# Patient Record
Sex: FEMALE | Race: ASIAN | NOT HISPANIC OR LATINO | Employment: OTHER | URBAN - METROPOLITAN AREA
[De-identification: names, ages, dates, MRNs, and addresses within clinical notes are randomized per-mention and may not be internally consistent; named-entity substitution may affect disease eponyms.]

---

## 2024-03-11 ENCOUNTER — OFFICE VISIT (OUTPATIENT)
Dept: PRIMARY CARE CLINIC | Facility: CLINIC | Age: 34
End: 2024-03-11
Payer: COMMERCIAL

## 2024-03-11 VITALS
OXYGEN SATURATION: 99 % | DIASTOLIC BLOOD PRESSURE: 80 MMHG | WEIGHT: 115.63 LBS | BODY MASS INDEX: 22.7 KG/M2 | HEART RATE: 73 BPM | SYSTOLIC BLOOD PRESSURE: 122 MMHG | HEIGHT: 60 IN

## 2024-03-11 DIAGNOSIS — K59.00 CONSTIPATION, UNSPECIFIED CONSTIPATION TYPE: Primary | ICD-10-CM

## 2024-03-11 DIAGNOSIS — K64.9 HEMORRHOIDS, UNSPECIFIED HEMORRHOID TYPE: ICD-10-CM

## 2024-03-11 PROCEDURE — 99203 OFFICE O/P NEW LOW 30 MIN: CPT | Mod: S$GLB,,, | Performed by: FAMILY MEDICINE

## 2024-03-11 PROCEDURE — 1159F MED LIST DOCD IN RCRD: CPT | Mod: CPTII,S$GLB,, | Performed by: FAMILY MEDICINE

## 2024-03-11 PROCEDURE — 99999 PR PBB SHADOW E&M-NEW PATIENT-LVL III: CPT | Mod: PBBFAC,,, | Performed by: FAMILY MEDICINE

## 2024-03-11 PROCEDURE — 3079F DIAST BP 80-89 MM HG: CPT | Mod: CPTII,S$GLB,, | Performed by: FAMILY MEDICINE

## 2024-03-11 PROCEDURE — 3008F BODY MASS INDEX DOCD: CPT | Mod: CPTII,S$GLB,, | Performed by: FAMILY MEDICINE

## 2024-03-11 PROCEDURE — 3074F SYST BP LT 130 MM HG: CPT | Mod: CPTII,S$GLB,, | Performed by: FAMILY MEDICINE

## 2024-03-11 NOTE — PROGRESS NOTES
Clinic Note  3/11/2024      Subjective:       Patient ID:  Marleny is a 33 y.o. female being seen for an established visit.    Chief Complaint: Constipation    Constipation-patient has been dealing with constipation for years and has tried multiple different over-the-counter laxatives and stool softeners including MiraLax, Metamucil, docusate, probiotics.  Patient has a bowel movement every 2-3 days, reports having very hard stools.  Patient has tried doing digital exam and felt a mass on the inside, no masses on the outside.  History of 3 pregnancies.  Denies any blood in stool, diarrhea, weight loss.      Review of Systems   Constitutional:  Negative for chills, fever, malaise/fatigue and weight loss.   HENT:  Negative for congestion, sinus pain and sore throat.    Respiratory:  Negative for cough, shortness of breath and wheezing.    Cardiovascular:  Negative for chest pain and palpitations.   Gastrointestinal:  Positive for constipation. Negative for abdominal pain, blood in stool, diarrhea, heartburn, melena, nausea and vomiting.   Genitourinary:  Negative for dysuria, frequency and urgency.   Musculoskeletal:  Negative for myalgias.   Skin:  Negative for rash.   Neurological:  Negative for headaches.           There is no problem list on file for this patient.          Objective:      /80 (BP Location: Left arm, Patient Position: Sitting, BP Method: Medium (Manual))   Pulse 73   Ht 5' (1.524 m)   Wt 52.4 kg (115 lb 10.1 oz)   LMP 02/06/2024 (Approximate)   SpO2 99%   BMI 22.58 kg/m²   Estimated body mass index is 22.58 kg/m² as calculated from the following:    Height as of this encounter: 5' (1.524 m).    Weight as of this encounter: 52.4 kg (115 lb 10.1 oz).  Physical Exam  Vitals reviewed.   Constitutional:       General: She is not in acute distress.     Appearance: She is not diaphoretic.   HENT:      Head: Normocephalic and atraumatic.   Eyes:      Conjunctiva/sclera: Conjunctivae normal.    Cardiovascular:      Rate and Rhythm: Normal rate and regular rhythm.      Heart sounds: Normal heart sounds.   Pulmonary:      Effort: Pulmonary effort is normal. No respiratory distress.      Breath sounds: Normal breath sounds. No wheezing.   Abdominal:      General: Bowel sounds are normal.      Palpations: Abdomen is soft.   Musculoskeletal:         General: Normal range of motion.      Cervical back: Normal range of motion.   Skin:     General: Skin is warm and dry.      Findings: No erythema or rash.   Neurological:      Mental Status: She is alert and oriented to person, place, and time.   Psychiatric:         Mood and Affect: Mood and affect normal.         Behavior: Behavior normal.         Thought Content: Thought content normal.         Judgment: Judgment normal.           Assessment and Plan:     1. Constipation, unspecified constipation type  - patient with constipation for years and has failed multiple over-the-counter laxatives and stool softeners, will do a trial of Linzess.  Discussed with patient that we can titrate the dose depending on her response.  Patient with symptoms consistent with internal hemorrhoids, though no anoscope urine clinic.  Refer to Colorectal surgery  - linaCLOtide (LINZESS) 145 mcg Cap capsule; Take 1 capsule (145 mcg total) by mouth before breakfast. Bon / di cau  Dispense: 15 capsule; Refill: 6  - Ambulatory referral/consult to Colorectal Surgery; Future    2. Hemorrhoids, unspecified hemorrhoid type  - Ambulatory referral/consult to Colorectal Surgery; Future      Follow Up:   No follow-ups on file.    Other Orders Placed This Visit:  No orders of the defined types were placed in this encounter.        Adonis Love MD        This note is dictated on M*Modal word recognition program.  There are word recognition mistakes that are occasionally missed on review.

## 2024-03-12 ENCOUNTER — PATIENT OUTREACH (OUTPATIENT)
Dept: ADMINISTRATIVE | Facility: OTHER | Age: 34
End: 2024-03-12
Payer: COMMERCIAL

## 2024-03-12 NOTE — PROGRESS NOTES
CHW - Initial Contact    This Community Health Worker completed the Social Determinant of Health questionnaire with MRN [id number] over the phone via  today.    Pt identified barriers of most importance are: No barriers reported   Referrals to community agencies completed with patient/caregiver consent outside of [facility] include: No   Referrals were put through [facility] - No   Support and Services: No   Other information discussed the patient needs / wants help with: SDOH completed. Patient declined needing assistance at this time. Case will be will be closed.   Follow up required: No   No future outreach task assigned

## 2024-03-20 ENCOUNTER — OFFICE VISIT (OUTPATIENT)
Dept: SURGERY | Facility: CLINIC | Age: 34
End: 2024-03-20
Payer: COMMERCIAL

## 2024-03-20 VITALS
DIASTOLIC BLOOD PRESSURE: 78 MMHG | HEART RATE: 73 BPM | BODY MASS INDEX: 22.59 KG/M2 | WEIGHT: 115.06 LBS | SYSTOLIC BLOOD PRESSURE: 120 MMHG | HEIGHT: 60 IN

## 2024-03-20 DIAGNOSIS — K64.9 HEMORRHOIDS, UNSPECIFIED HEMORRHOID TYPE: ICD-10-CM

## 2024-03-20 DIAGNOSIS — K59.00 CONSTIPATION, UNSPECIFIED CONSTIPATION TYPE: ICD-10-CM

## 2024-03-20 PROCEDURE — 1160F RVW MEDS BY RX/DR IN RCRD: CPT | Mod: CPTII,S$GLB,, | Performed by: NURSE PRACTITIONER

## 2024-03-20 PROCEDURE — 1159F MED LIST DOCD IN RCRD: CPT | Mod: CPTII,S$GLB,, | Performed by: NURSE PRACTITIONER

## 2024-03-20 PROCEDURE — 3074F SYST BP LT 130 MM HG: CPT | Mod: CPTII,S$GLB,, | Performed by: NURSE PRACTITIONER

## 2024-03-20 PROCEDURE — 3008F BODY MASS INDEX DOCD: CPT | Mod: CPTII,S$GLB,, | Performed by: NURSE PRACTITIONER

## 2024-03-20 PROCEDURE — 99999 PR PBB SHADOW E&M-EST. PATIENT-LVL III: CPT | Mod: PBBFAC,,, | Performed by: NURSE PRACTITIONER

## 2024-03-20 PROCEDURE — 99204 OFFICE O/P NEW MOD 45 MIN: CPT | Mod: 25,S$GLB,, | Performed by: NURSE PRACTITIONER

## 2024-03-20 PROCEDURE — 46600 DIAGNOSTIC ANOSCOPY SPX: CPT | Mod: S$GLB,,, | Performed by: NURSE PRACTITIONER

## 2024-03-20 PROCEDURE — 3078F DIAST BP <80 MM HG: CPT | Mod: CPTII,S$GLB,, | Performed by: NURSE PRACTITIONER

## 2024-03-20 RX ORDER — HYDROCORTISONE 25 MG/G
CREAM TOPICAL 2 TIMES DAILY
Qty: 28 G | Refills: 1 | Status: SHIPPED | OUTPATIENT
Start: 2024-03-20 | End: 2024-05-07 | Stop reason: SDUPTHER

## 2024-03-20 NOTE — PROGRESS NOTES
CRS Office Visit History and Physical    Referring Md:   Adonis Love Md  [address] [place] [address]    SUBJECTIVE:     Chief Complaint: constipation and anorectal lesion, here with her     History of Present Illness:  The patient is new patient to this practice. HamiltonCharbel galloway #[id number]  Course is as follows:  Patient is a 33 y.o. female presents with constipation and anorectal lesion.  Constipation more than 10 years, she digitally tries to remove her stool and recently noticed a Bump in her rectum. Bump present more than a month ago.  +discomfort  +straining and bleeding sometimes.   Was just prescribed linzess 145 mcg by her PCP , it was too expensive so she never started as it was over $200  She has tried multiple OTC meds for the constipation, and they work if she takes them but if she stop constipation returns.      Last Colonoscopy: never  Family history of colorectal cancer or IBD: no.    Review of patient's allergies indicates:  No Known Allergies    History reviewed. No pertinent past medical history.  History reviewed. No pertinent surgical history.  History reviewed. No pertinent family history.  Social History     Tobacco Use    Smoking status: Never    Smokeless tobacco: Never        Review of Systems:  Review of Systems   Constitutional:  Negative for chills, fever and weight loss.   Gastrointestinal:  Negative for nausea and vomiting.       OBJECTIVE:     Vital Signs (Most Recent)  /78 (BP Location: Left arm, Patient Position: Sitting)   Pulse 73   Ht 5' (1.524 m)   Wt 52.2 kg (115 lb 1.3 oz)   LMP 02/06/2024 (Approximate)   BMI 22.48 kg/m²     Physical Exam:  General:  female in no distress   Neuro: Alert and oriented to person, place, and time.  Moves all extremities.     HEENT: No icterus.  Trachea midline  Respiratory: Respirations are even and unlabored, no cough or audible wheezing  Skin: Warm dry and intact, No visible rashes, no jaundice    Labs  reviewed today:  Lab Results   Component Value Date    INR 0.9 09/19/2023         Anorectal Exam:  Cony soriano    Anal Skin: External hemorrhoids    Digital Rectal Exam:  Resting Tone normal  Mass hemorrhoids  Tenderness  absent    Anoscopy:  Verbal consent was obtained.   Clear plastic anoscope inserted.    Hemorrhoids  present  Stigmata of bleeding  present  Stigmata of prolapsed  absent  Distal rectal mucosa normal          ASSESSMENT/PLAN:     Diagnoses and all orders for this visit:    Constipation, unspecified constipation type  -     Ambulatory referral/consult to Colorectal Surgery  -     linaCLOtide (LINZESS) 145 mcg Cap capsule; Take 1 capsule (145 mcg total) by mouth before breakfast.    Hemorrhoids, unspecified hemorrhoid type  -     Ambulatory referral/consult to Colorectal Surgery  -     hydrocortisone (ANUSOL-HC) 2.5 % rectal cream; Place rectally 2 (two) times daily.      33 y.o. F here for chronic constipation. This was discussed in detail today. This is a greater than 10 yr hx, so likely idiopathic. Does respond to OTC colace if taken regularly, which is fine for her to take. We can try sending the linzess to an [facility] and maybe they can get her insurance to cover this   For the anal lumps she is feeling, these are likely her hemorrhoids as I didn't see any other lesions today one exam    The patient was instructed to:  Colace or linzess daily  Anusol BID to internal hemorrhoids x 2 weeks, repeat PRN  If constipation worsens follow up with a gastroenterologist  If hemorrhoids worsen, follow up with FELISA Mclaughlin-ANGELICA  Colon and Rectal Surgery

## 2024-05-07 DIAGNOSIS — K64.9 HEMORRHOIDS, UNSPECIFIED HEMORRHOID TYPE: ICD-10-CM

## 2024-05-07 RX ORDER — HYDROCORTISONE 25 MG/G
CREAM TOPICAL
Qty: 30 G | Refills: 1 | Status: SHIPPED | OUTPATIENT
Start: 2024-05-07

## 2024-07-13 DIAGNOSIS — K64.9 HEMORRHOIDS, UNSPECIFIED HEMORRHOID TYPE: ICD-10-CM

## 2024-07-15 RX ORDER — HYDROCORTISONE 25 MG/G
CREAM TOPICAL
Qty: 30 G | Refills: 1 | Status: SHIPPED | OUTPATIENT
Start: 2024-07-15